# Patient Record
Sex: FEMALE | Race: BLACK OR AFRICAN AMERICAN | NOT HISPANIC OR LATINO | URBAN - NONMETROPOLITAN AREA
[De-identification: names, ages, dates, MRNs, and addresses within clinical notes are randomized per-mention and may not be internally consistent; named-entity substitution may affect disease eponyms.]

---

## 2019-04-15 ENCOUNTER — OFFICE VISIT (OUTPATIENT)
Dept: RETAIL CLINIC | Facility: CLINIC | Age: 24
End: 2019-04-15

## 2019-04-15 VITALS
WEIGHT: 130 LBS | TEMPERATURE: 97.6 F | DIASTOLIC BLOOD PRESSURE: 80 MMHG | SYSTOLIC BLOOD PRESSURE: 108 MMHG | OXYGEN SATURATION: 97 % | HEART RATE: 85 BPM | BODY MASS INDEX: 23.92 KG/M2 | HEIGHT: 62 IN | RESPIRATION RATE: 16 BRPM

## 2019-04-15 DIAGNOSIS — J02.9 PHARYNGITIS, UNSPECIFIED ETIOLOGY: Primary | ICD-10-CM

## 2019-04-15 PROCEDURE — 99203 OFFICE O/P NEW LOW 30 MIN: CPT | Performed by: NURSE PRACTITIONER

## 2019-04-15 RX ORDER — ACETAMINOPHEN 325 MG/1
650 TABLET ORAL ONCE
Status: COMPLETED | OUTPATIENT
Start: 2019-04-15 | End: 2019-04-15

## 2019-04-15 RX ORDER — PANTOPRAZOLE SODIUM 40 MG/1
40 TABLET, DELAYED RELEASE ORAL DAILY
COMMUNITY

## 2019-04-15 RX ORDER — ACETAMINOPHEN 160 MG/5ML
SUSPENSION ORAL
Qty: 600 ML | Refills: 0 | Status: SHIPPED | OUTPATIENT
Start: 2019-04-15

## 2019-04-15 RX ORDER — LOPERAMIDE HYDROCHLORIDE 2 MG/1
2 CAPSULE ORAL 4 TIMES DAILY PRN
COMMUNITY

## 2019-04-15 RX ORDER — AMOXICILLIN 400 MG/5ML
800 POWDER, FOR SUSPENSION ORAL 2 TIMES DAILY
Qty: 200 ML | Refills: 0 | Status: SHIPPED | OUTPATIENT
Start: 2019-04-15 | End: 2019-04-25

## 2019-04-15 RX ADMIN — ACETAMINOPHEN 650 MG: 325 TABLET ORAL at 12:25

## 2019-04-15 NOTE — PATIENT INSTRUCTIONS
You received a dose of acetaminophen 650 mg at 1225 PM. You may repeat dosing in 4 hours if needed  Cold liquids may help with throat pain  Drink plenty of water!  Soft diet only  Follow up with primary care provider if not improved within 3 days, sooner if worse or new symptoms  Watch for allergic reaction to new medications      Sore Throat  When you have a sore throat, your throat may:  · Hurt.  · Burn.  · Feel irritated.  · Feel scratchy.    Many things can cause a sore throat, including:  · An infection.  · Allergies.  · Dryness in the air.  · Smoke or pollution.  · Gastroesophageal reflux disease (GERD).  · A tumor.    A sore throat can be the first sign of another sickness. It can happen with other problems, like coughing or a fever. Most sore throats go away without treatment.  Follow these instructions at home:  · Take over-the-counter medicines only as told by your doctor.  · Drink enough fluids to keep your pee (urine) clear or pale yellow.  · Rest when you feel you need to.  · To help with pain, try:  ? Sipping warm liquids, such as broth, herbal tea, or warm water.  ? Eating or drinking cold or frozen liquids, such as frozen ice pops.  ? Gargling with a salt-water mixture 3-4 times a day or as needed. To make a salt-water mixture, add ½-1 tsp of salt in 1 cup of warm water. Mix it until you cannot see the salt anymore.  ? Sucking on hard candy or throat lozenges.  ? Putting a cool-mist humidifier in your bedroom at night.  ? Sitting in the bathroom with the door closed for 5-10 minutes while you run hot water in the shower.  · Do not use any tobacco products, such as cigarettes, chewing tobacco, and e-cigarettes. If you need help quitting, ask your doctor.  Contact a doctor if:  · You have a fever for more than 2-3 days.  · You keep having symptoms for more than 2-3 days.  · Your throat does not get better in 7 days.  · You have a fever and your symptoms suddenly get worse.  Get help right away  if:  · You have trouble breathing.  · You cannot swallow fluids, soft foods, or your saliva.  · You have swelling in your throat or neck that gets worse.  · You keep feeling like you are going to throw up (vomit).  · You keep throwing up.  This information is not intended to replace advice given to you by your health care provider. Make sure you discuss any questions you have with your health care provider.  Document Released: 09/26/2009 Document Revised: 08/13/2017 Document Reviewed: 10/07/2016  [employer] Interactive Patient Education © 2019 [employer]

## 2019-04-15 NOTE — PROGRESS NOTES
Subjective   Sore Throat    Edilia CHUCKY Erazo is a 23 y.o. female who presents with sore throat and painful swallowing.     Sore Throat    This is a new problem. Episode onset: 4 days. The problem has been gradually worsening. Neither side of throat is experiencing more pain than the other. Maximum temperature: subjective. Pain scale: 7-9. Associated symptoms include diarrhea (on and off), ear pain (mild), headaches (mild), a plugged ear sensation, swollen glands and trouble swallowing (painful). Pertinent negatives include no abdominal pain, congestion, coughing, ear discharge, hoarse voice, shortness of breath, stridor or vomiting. She has tried nothing for the symptoms.        History Obtained from: Patient    Past Medical History:   Diagnosis Date   • Allergic    • Anxiety    • Crohn disease (CMS/HCC)    • Elevated cholesterol     history of: most recent labs normal   • History of ear infections      Past Surgical History:   Procedure Laterality Date   • COLONOSCOPY  09/2018   • ENDOSCOPY  09/2018     Social History     Tobacco Use   • Smoking status: Current Every Day Smoker     Types: Cigars   • Smokeless tobacco: Never Used   • Tobacco comment: 2/daily   Substance Use Topics   • Alcohol use: No     Frequency: Never   • Drug use: Yes     Types: Marijuana     Family History   Problem Relation Age of Onset   • Autoimmune disease Mother         Lupus   • Diabetes Father    • Obesity Father      No Known Allergies  Current Outpatient Medications   Medication Sig Dispense Refill   • loperamide (IMODIUM) 2 MG capsule Take 2 mg by mouth 4 (Four) Times a Day As Needed for Diarrhea.     • pantoprazole (PROTONIX) 40 MG EC tablet Take 40 mg by mouth Daily.     • acetaminophen (TYLENOL) 160 MG/5ML liquid Take 20 mL by mouth every 4 hours as needed for pain/fever 600 mL 0   • amoxicillin (AMOXIL) 400 MG/5ML suspension Take 10 mL by mouth 2 (Two) Times a Day for 10 days. 200 mL 0   • lidocaine viscous (XYLOCAINE) 2 %  "solution Take 5 mL by mouth Every 2 (Two) Hours As Needed for Moderate Pain  (painful swallowing). Every 2-3 hours prn 100 mL 0     Current Facility-Administered Medications   Medication Dose Route Frequency Provider Last Rate Last Dose   • acetaminophen (TYLENOL) tablet 650 mg  650 mg Oral Once Laury, GREY Urrutia            The following portions of the patient's history were reviewed and updated as appropriate: allergies, current medications, past family history, past medical history, past social history and past surgical history.    Review of Systems   Constitutional: Positive for chills, fatigue and fever.   HENT: Positive for ear pain (mild), sore throat and trouble swallowing (painful). Negative for congestion, ear discharge, hoarse voice, rhinorrhea, sneezing and voice change.    Eyes: Negative.    Respiratory: Negative for cough, shortness of breath, wheezing and stridor.    Cardiovascular: Negative for chest pain and palpitations.   Gastrointestinal: Positive for diarrhea (on and off) and nausea. Negative for abdominal pain and vomiting.   Musculoskeletal: Negative for back pain and myalgias.   Skin: Negative for rash and wound.   Allergic/Immunologic: Positive for environmental allergies. Negative for immunocompromised state.   Neurological: Positive for headaches (mild). Negative for dizziness.   Hematological: Positive for adenopathy.   Psychiatric/Behavioral: Negative.        Objective     VITAL SIGNS:   Vitals:    04/15/19 1215   BP: 108/80   Pulse: 85   Resp: 16   Temp: 97.6 °F (36.4 °C)   SpO2: 97%   Weight: 59 kg (130 lb)   Height: 157.5 cm (62\")   PainSc:   7   PainLoc: Throat   Body mass index is 23.78 kg/m².    Physical Exam   Constitutional: She appears well-nourished.  Non-toxic appearance. No distress.   HENT:   Head: Atraumatic.   Right Ear: Tympanic membrane, external ear and ear canal normal.   Left Ear: Tympanic membrane, external ear and ear canal normal.   Nose: Nose normal. "   Mouth/Throat: Uvula is midline and mucous membranes are normal. Uvula swelling present. Posterior oropharyngeal erythema present. No oropharyngeal exudate or posterior oropharyngeal edema. Tonsils are 1+ on the right. Tonsils are 1+ on the left. No tonsillar exudate.   Eyes: Lids are normal. Pupils are equal, round, and reactive to light. Right conjunctiva is not injected. Left conjunctiva is not injected. No scleral icterus.   Neck: Normal range of motion and phonation normal. Neck supple. No JVD present. No tracheal deviation present.   Cardiovascular: Normal rate and regular rhythm.   No murmur heard.  Pulmonary/Chest: Effort normal and breath sounds normal.   Lymphadenopathy:        Head (right side): Tonsillar adenopathy present.        Head (left side): Tonsillar adenopathy present.        Right: No supraclavicular adenopathy present.        Left: No supraclavicular adenopathy present.   Neurological: She is alert. Gait normal.   Skin: Skin is warm and dry.   Psychiatric: Her behavior is normal. She is attentive.       LABS: No results found for this or any previous visit.        Assessment/Plan     Edilia was seen today for sore throat.    Diagnoses and all orders for this visit:    Pharyngitis, unspecified etiology  -     acetaminophen (TYLENOL) tablet 650 mg    Other orders  -     amoxicillin (AMOXIL) 400 MG/5ML suspension; Take 10 mL by mouth 2 (Two) Times a Day for 10 days.  -     acetaminophen (TYLENOL) 160 MG/5ML liquid; Take 20 mL by mouth every 4 hours as needed for pain/fever  -     lidocaine viscous (XYLOCAINE) 2 % solution; Take 5 mL by mouth Every 2 (Two) Hours As Needed for Moderate Pain  (painful swallowing). Every 2-3 hours prn      PLAN:  OTC management of symptoms discussed with patient. Patient should follow up with primary care provider if symptoms fail to improve, worsen or for the development of new symptoms that need attention.    The patient voiced understanding and agreement to the  patient treatment plan and instructions     Elaine Schaeffer, APRN

## 2020-06-22 VITALS — WEIGHT: 150 LBS | HEIGHT: 62 IN

## 2020-06-23 ENCOUNTER — OFFICE (OUTPATIENT)
Dept: URBAN - METROPOLITAN AREA CLINIC 75 | Facility: CLINIC | Age: 25
End: 2020-06-23

## 2020-06-23 DIAGNOSIS — R10.10 UPPER ABDOMINAL PAIN, UNSPECIFIED: ICD-10-CM

## 2020-06-23 DIAGNOSIS — R13.10 DYSPHAGIA, UNSPECIFIED: ICD-10-CM

## 2020-06-23 DIAGNOSIS — R11.2 NAUSEA WITH VOMITING, UNSPECIFIED: ICD-10-CM

## 2020-06-23 DIAGNOSIS — R63.0 ANOREXIA: ICD-10-CM

## 2020-06-23 DIAGNOSIS — R14.0 ABDOMINAL DISTENSION (GASEOUS): ICD-10-CM

## 2020-06-23 DIAGNOSIS — R19.7 DIARRHEA, UNSPECIFIED: ICD-10-CM

## 2020-06-23 DIAGNOSIS — K59.00 CONSTIPATION, UNSPECIFIED: ICD-10-CM

## 2020-06-23 PROCEDURE — 99243 OFF/OP CNSLTJ NEW/EST LOW 30: CPT | Performed by: INTERNAL MEDICINE

## 2021-03-11 ENCOUNTER — OFFICE (OUTPATIENT)
Dept: URBAN - METROPOLITAN AREA CLINIC 75 | Facility: CLINIC | Age: 26
End: 2021-03-11
Payer: COMMERCIAL

## 2021-03-11 VITALS
WEIGHT: 150 LBS | HEART RATE: 102 BPM | HEIGHT: 62 IN | SYSTOLIC BLOOD PRESSURE: 104 MMHG | DIASTOLIC BLOOD PRESSURE: 66 MMHG | OXYGEN SATURATION: 98 % | TEMPERATURE: 97.2 F

## 2021-03-11 DIAGNOSIS — R10.10 UPPER ABDOMINAL PAIN, UNSPECIFIED: ICD-10-CM

## 2021-03-11 DIAGNOSIS — R63.0 ANOREXIA: ICD-10-CM

## 2021-03-11 DIAGNOSIS — R19.7 DIARRHEA, UNSPECIFIED: ICD-10-CM

## 2021-03-11 DIAGNOSIS — K59.00 CONSTIPATION, UNSPECIFIED: ICD-10-CM

## 2021-03-11 DIAGNOSIS — R14.0 ABDOMINAL DISTENSION (GASEOUS): ICD-10-CM

## 2021-03-11 DIAGNOSIS — R11.2 NAUSEA WITH VOMITING, UNSPECIFIED: ICD-10-CM

## 2021-03-11 PROCEDURE — 99214 OFFICE O/P EST MOD 30 MIN: CPT | Performed by: NURSE PRACTITIONER

## 2021-03-11 RX ORDER — DICYCLOMINE HYDROCHLORIDE 10 MG/1
CAPSULE ORAL
Qty: 90 | Refills: 11 | Status: COMPLETED
Start: 2021-03-11 | End: 2021-11-03

## 2021-11-03 ENCOUNTER — OFFICE (OUTPATIENT)
Dept: URBAN - METROPOLITAN AREA CLINIC 75 | Facility: CLINIC | Age: 26
End: 2021-11-03
Payer: COMMERCIAL

## 2021-11-03 VITALS
WEIGHT: 159 LBS | OXYGEN SATURATION: 99 % | HEIGHT: 62 IN | HEART RATE: 74 BPM | SYSTOLIC BLOOD PRESSURE: 108 MMHG | DIASTOLIC BLOOD PRESSURE: 62 MMHG

## 2021-11-03 DIAGNOSIS — R19.7 DIARRHEA, UNSPECIFIED: ICD-10-CM

## 2021-11-03 DIAGNOSIS — R10.10 UPPER ABDOMINAL PAIN, UNSPECIFIED: ICD-10-CM

## 2021-11-03 DIAGNOSIS — K59.00 CONSTIPATION, UNSPECIFIED: ICD-10-CM

## 2021-11-03 PROCEDURE — 99214 OFFICE O/P EST MOD 30 MIN: CPT | Performed by: NURSE PRACTITIONER

## 2022-03-08 ENCOUNTER — OFFICE (OUTPATIENT)
Dept: URBAN - METROPOLITAN AREA CLINIC 75 | Facility: CLINIC | Age: 27
End: 2022-03-08
Payer: COMMERCIAL

## 2022-03-08 VITALS
HEART RATE: 70 BPM | DIASTOLIC BLOOD PRESSURE: 62 MMHG | HEIGHT: 62 IN | OXYGEN SATURATION: 98 % | WEIGHT: 156 LBS | SYSTOLIC BLOOD PRESSURE: 110 MMHG

## 2022-03-08 DIAGNOSIS — R19.7 DIARRHEA, UNSPECIFIED: ICD-10-CM

## 2022-03-08 DIAGNOSIS — R10.10 UPPER ABDOMINAL PAIN, UNSPECIFIED: ICD-10-CM

## 2022-03-08 DIAGNOSIS — R14.0 ABDOMINAL DISTENSION (GASEOUS): ICD-10-CM

## 2022-03-08 DIAGNOSIS — R10.11 RIGHT UPPER QUADRANT PAIN: ICD-10-CM

## 2022-03-08 DIAGNOSIS — R11.2 NAUSEA WITH VOMITING, UNSPECIFIED: ICD-10-CM

## 2022-03-08 DIAGNOSIS — R63.0 ANOREXIA: ICD-10-CM

## 2022-03-08 PROCEDURE — 99214 OFFICE O/P EST MOD 30 MIN: CPT | Performed by: NURSE PRACTITIONER

## 2022-03-08 RX ORDER — DICYCLOMINE HYDROCHLORIDE 10 MG/1
CAPSULE ORAL
Qty: 90 | Refills: 3 | Status: COMPLETED
Start: 2022-03-08 | End: 2022-07-14

## 2022-03-08 RX ORDER — OMEPRAZOLE 20 MG/1
CAPSULE, DELAYED RELEASE ORAL
Qty: 90 | Refills: 3 | Status: COMPLETED
Start: 2022-03-08 | End: 2022-07-14

## 2022-04-07 ENCOUNTER — OFFICE (OUTPATIENT)
Dept: URBAN - METROPOLITAN AREA CLINIC 75 | Facility: CLINIC | Age: 27
End: 2022-04-07
Payer: COMMERCIAL

## 2022-04-07 VITALS
DIASTOLIC BLOOD PRESSURE: 80 MMHG | OXYGEN SATURATION: 99 % | HEIGHT: 62 IN | HEART RATE: 60 BPM | TEMPERATURE: 98 F | WEIGHT: 159 LBS | SYSTOLIC BLOOD PRESSURE: 110 MMHG

## 2022-04-07 DIAGNOSIS — R11.2 NAUSEA WITH VOMITING, UNSPECIFIED: ICD-10-CM

## 2022-04-07 DIAGNOSIS — K21.9 GASTRO-ESOPHAGEAL REFLUX DISEASE WITHOUT ESOPHAGITIS: ICD-10-CM

## 2022-04-07 DIAGNOSIS — R19.7 DIARRHEA, UNSPECIFIED: ICD-10-CM

## 2022-04-07 PROCEDURE — 99214 OFFICE O/P EST MOD 30 MIN: CPT | Performed by: NURSE PRACTITIONER

## 2022-04-07 RX ORDER — HYOSCYAMINE SULFATE 0.12 MG/1
TABLET ORAL; SUBLINGUAL
Qty: 90 | Refills: 3 | Status: COMPLETED
Start: 2022-04-07 | End: 2022-07-14

## 2022-07-14 ENCOUNTER — OFFICE (OUTPATIENT)
Dept: URBAN - METROPOLITAN AREA CLINIC 75 | Facility: CLINIC | Age: 27
End: 2022-07-14
Payer: COMMERCIAL

## 2022-07-14 VITALS
HEIGHT: 62 IN | WEIGHT: 163 LBS | OXYGEN SATURATION: 98 % | HEART RATE: 68 BPM | DIASTOLIC BLOOD PRESSURE: 81 MMHG | SYSTOLIC BLOOD PRESSURE: 119 MMHG

## 2022-07-14 DIAGNOSIS — R14.0 ABDOMINAL DISTENSION (GASEOUS): ICD-10-CM

## 2022-07-14 DIAGNOSIS — R11.0 NAUSEA: ICD-10-CM

## 2022-07-14 DIAGNOSIS — K59.00 CONSTIPATION, UNSPECIFIED: ICD-10-CM

## 2022-07-14 DIAGNOSIS — R10.10 UPPER ABDOMINAL PAIN, UNSPECIFIED: ICD-10-CM

## 2022-07-14 DIAGNOSIS — R19.7 DIARRHEA, UNSPECIFIED: ICD-10-CM

## 2022-07-14 DIAGNOSIS — K21.9 GASTRO-ESOPHAGEAL REFLUX DISEASE WITHOUT ESOPHAGITIS: ICD-10-CM

## 2022-07-14 PROCEDURE — 99214 OFFICE O/P EST MOD 30 MIN: CPT | Performed by: NURSE PRACTITIONER

## 2022-07-14 RX ORDER — FAMOTIDINE 40 MG/1
40 TABLET, FILM COATED ORAL
Qty: 30 | Refills: 11 | Status: ACTIVE
Start: 2022-07-14